# Patient Record
Sex: FEMALE | Race: WHITE | NOT HISPANIC OR LATINO | URBAN - METROPOLITAN AREA
[De-identification: names, ages, dates, MRNs, and addresses within clinical notes are randomized per-mention and may not be internally consistent; named-entity substitution may affect disease eponyms.]

---

## 2019-01-08 ENCOUNTER — EMERGENCY (EMERGENCY)
Facility: HOSPITAL | Age: 79
LOS: 0 days | Discharge: HOME | End: 2019-01-08
Attending: EMERGENCY MEDICINE | Admitting: PHYSICIAN ASSISTANT

## 2019-01-08 VITALS
HEART RATE: 76 BPM | OXYGEN SATURATION: 97 % | DIASTOLIC BLOOD PRESSURE: 63 MMHG | SYSTOLIC BLOOD PRESSURE: 143 MMHG | RESPIRATION RATE: 18 BRPM | TEMPERATURE: 97 F

## 2019-01-08 VITALS — WEIGHT: 124.34 LBS
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
## 2019-01-08 DIAGNOSIS — Z98.890 OTHER SPECIFIED POSTPROCEDURAL STATES: Chronic | ICD-10-CM

## 2019-01-08 DIAGNOSIS — M25.512 PAIN IN LEFT SHOULDER: ICD-10-CM

## 2019-01-08 DIAGNOSIS — M54.2 CERVICALGIA: ICD-10-CM

## 2019-01-08 DIAGNOSIS — Z90.89 ACQUIRED ABSENCE OF OTHER ORGANS: ICD-10-CM

## 2019-01-08 DIAGNOSIS — M25.511 PAIN IN RIGHT SHOULDER: ICD-10-CM
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
## 2019-01-08 DIAGNOSIS — K21.9 GASTRO-ESOPHAGEAL REFLUX DISEASE WITHOUT ESOPHAGITIS: ICD-10-CM
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
## 2019-01-08 RX ORDER — KETOROLAC TROMETHAMINE 30 MG/ML
15 SYRINGE (ML) INJECTION ONCE
Qty: 0 | Refills: 0 | Status: DISCONTINUED | OUTPATIENT
Start: 2019-01-08 | End: 2019-01-08

## 2019-01-08 RX ADMIN — Medication 15 MILLIGRAM(S): at 16:44

## 2019-01-08 NOTE — ED PROVIDER NOTE - ATTENDING CONTRIBUTION TO CARE
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   No additional complaints.  Well-appearing, FROM at all joints, neurovascularly intact. Advised to follow up with an orthopedist.  patient amenable with the plan.

## 2019-01-08 NOTE — ED PROVIDER NOTE - NS ED ROS FT
Review of Systems         Constitutional: (-) fever (-) chills (-) weakness       EENT: (-) visual changes (-) sore throat       Cardiovascular: (-) chest pain (-) syncope       Respiratory: (-) cough, (-) shortness of breath       Gastrointestinal: (-) abdominal pain (-) vomiting (-) diarrhea (-) nausea        Genitourinary: (-) dysuria       Musculoskeletal: (+) neck pain (-) back pain (-) joint pain       Integumentary: (-) rash       Neurological: (-) headache (-) altered mental status (-) dizziness (+) paresthesias       Psych: (-) psych history

## 2019-01-08 NOTE — ED PROVIDER NOTE - PROGRESS NOTE DETAILS
MARTA Jane: I was directly involved in the care and management of this patient while supervising PA Fellow

## 2019-01-08 NOTE — ED PROVIDER NOTE - NSFOLLOWUPCLINICS_GEN_ALL_ED_FT
[facility]  Orthpedic  [address], [place]   Phone: [phone]  Fax:   Follow Up Time: 1-3 Days

## 2019-01-08 NOTE — ED PROVIDER NOTE - CARE PROVIDER_API CALL
Roby Zuleta), Orthopaedic Surgery  [facility] [place] [address]  Phone: [phone]  Fax: [phone]

## 2019-01-08 NOTE — ED PROVIDER NOTE - NSFOLLOWUPINSTRUCTIONS_ED_ALL_ED_FT
-Follow up with orthopedics - Clinic or Dr. Zuleta in 1- 3 days  -Treat shoulder pain with Take 400 mg of Ibuprofen every 6-8 hours as needed for pain with food; food first, then medicine or Tylenol 325-650 mg every 6 hours  -Return to ED for worsening symptoms or concerns

## 2019-01-08 NOTE — ED ADULT NURSE NOTE - OBJECTIVE STATEMENT
patient c/o neck pain radiating down her right arm. states she was carrying heavy bottles yesterday.

## 2019-01-08 NOTE — ED PROVIDER NOTE - PHYSICAL EXAMINATION
Physical Exam    Vital Signs: I have reviewed the initial vital signs  Constitutional: well-nourished, appears stated age, no acute distress  HEENT: Conjunctiva pink, Sclera clear, PERRLA, EOMI. Mucous membranes moist, no exudates or lesions noted, uvula midline.   Cardiovascular: S1 and S2 present, regular rate, regular rhythm. radial pulses equal and 2+   Respiratory: unlabored respiratory effort, clear to auscultation bilaterally no wheezing, rales and rhonchi  Gastrointestinal: soft, non-tender abdomen, no pulsatile mass, active bowel sounds in all 4 quadrants. No guarding or rebound tenderness  Musculoskeletal: supple nontender neck, no midline tenderness, trapezius TTP bilaterally. TTP in anterior shoulder. Sensorimotor intact bilaterally. Upper arm pain reproducible with shoulder flexion. Int/Ext rotation 5/5, - empty can test. Limited ROM in neck d/t pain.   Integumentary: warm, dry, no rash  Neurologic: A & O x 3, CN II-XII grossly intact, all extremities’ motor and sensory functions grossly intact  Psychiatric: appropriate mood, appropriate affect

## 2019-01-08 NOTE — ED PROVIDER NOTE - OBJECTIVE STATEMENT
78 year old female hx OA, spinal stenosis with cervical laminectomy, GERD, hypothyroid presenting with chronic shoulder pain x > 2 years. Patient presents today because the pain, which is in the same location and quality as her chronic arm pain, did not resolve after the morning as usual. She also states the pain woke her up in the middle of the night and she was unable to get to sleep and admits to fatigue. admits to steroid injections in this join 2 years ago which helped. Has not taken anything for the pain. Patient denies chest pain, shortness of breath, abdominal pain, headache, dizziness, balance issues. Patient states her hand will usually be red in the morning but it resolves over time. Admits to neck pain and paresthesias which has been unchanged.

## 2019-01-08 NOTE — ED PROVIDER NOTE - MEDICAL DECISION MAKING DETAILS
77 yo h/o OA, cervical spinal stenosis with non-traumatic rt shoulder pain.  Likely exacerbation of chronic condition. Analgesia given, f/u ortho.

## 2019-02-04 ENCOUNTER — OUTPATIENT (OUTPATIENT)
Dept: OUTPATIENT SERVICES | Facility: HOSPITAL | Age: 79
LOS: 1 days | Discharge: HOME | End: 2019-02-04

## 2019-02-04 DIAGNOSIS — Z98.890 OTHER SPECIFIED POSTPROCEDURAL STATES: Chronic | ICD-10-CM

## 2019-02-04 PROBLEM — M48.00 SPINAL STENOSIS, SITE UNSPECIFIED: Chronic | Status: ACTIVE | Noted: 2019-01-08

## 2019-02-04 PROBLEM — M19.90 UNSPECIFIED OSTEOARTHRITIS, UNSPECIFIED SITE: Chronic | Status: ACTIVE | Noted: 2019-01-08

## 2019-02-04 PROBLEM — E03.9 HYPOTHYROIDISM, UNSPECIFIED: Chronic | Status: ACTIVE | Noted: 2019-01-08

## 2019-02-11 DIAGNOSIS — Z98.818 OTHER DENTAL PROCEDURE STATUS: ICD-10-CM

## 2019-12-12 NOTE — ED PROVIDER NOTE - CHIEF COMPLAINT
The patient is a 78y Female complaining of arm pain/injury. bilateral upper extremity Active ROM was WFL (within functional limits)/bilateral  lower extremity Active ROM was WFL (within functional limits)